# Patient Record
Sex: MALE | Race: WHITE | NOT HISPANIC OR LATINO | URBAN - METROPOLITAN AREA
[De-identification: names, ages, dates, MRNs, and addresses within clinical notes are randomized per-mention and may not be internally consistent; named-entity substitution may affect disease eponyms.]

---

## 2024-10-28 ENCOUNTER — HOSPITAL ENCOUNTER (EMERGENCY)
Facility: HOSPITAL | Age: 44
Discharge: HOME | End: 2024-10-29
Attending: EMERGENCY MEDICINE
Payer: COMMERCIAL

## 2024-10-28 ENCOUNTER — OFFICE VISIT (OUTPATIENT)
Dept: URGENT CARE | Facility: CLINIC | Age: 44
End: 2024-10-28
Payer: COMMERCIAL

## 2024-10-28 VITALS
HEART RATE: 64 BPM | TEMPERATURE: 98.6 F | DIASTOLIC BLOOD PRESSURE: 77 MMHG | OXYGEN SATURATION: 99 % | SYSTOLIC BLOOD PRESSURE: 112 MMHG | RESPIRATION RATE: 18 BRPM

## 2024-10-28 DIAGNOSIS — R19.7 DIARRHEA, UNSPECIFIED TYPE: Primary | ICD-10-CM

## 2024-10-28 DIAGNOSIS — K52.9 COLITIS: Primary | ICD-10-CM

## 2024-10-28 DIAGNOSIS — N20.0 NEPHROLITHIASIS: ICD-10-CM

## 2024-10-28 DIAGNOSIS — R19.7 DIARRHEA: ICD-10-CM

## 2024-10-28 DIAGNOSIS — R79.89 ELEVATED TSH: ICD-10-CM

## 2024-10-28 LAB
ALBUMIN SERPL BCG-MCNC: 4.4 G/DL (ref 3.5–5)
ALP SERPL-CCNC: 45 U/L (ref 34–104)
ALT SERPL W P-5'-P-CCNC: 17 U/L (ref 7–52)
AMORPH URATE CRY URNS QL MICRO: ABNORMAL
ANION GAP SERPL CALCULATED.3IONS-SCNC: 9 MMOL/L (ref 4–13)
AST SERPL W P-5'-P-CCNC: 19 U/L (ref 13–39)
BACTERIA UR QL AUTO: ABNORMAL /HPF
BASOPHILS # BLD AUTO: 0.1 THOUSANDS/ΜL (ref 0–0.1)
BASOPHILS NFR BLD AUTO: 1 % (ref 0–1)
BILIRUB SERPL-MCNC: 1.49 MG/DL (ref 0.2–1)
BILIRUB UR QL STRIP: NEGATIVE
BUN SERPL-MCNC: 12 MG/DL (ref 5–25)
CALCIUM SERPL-MCNC: 8.6 MG/DL (ref 8.4–10.2)
CHLORIDE SERPL-SCNC: 107 MMOL/L (ref 96–108)
CLARITY UR: CLEAR
CO2 SERPL-SCNC: 24 MMOL/L (ref 21–32)
COLOR UR: YELLOW
CREAT SERPL-MCNC: 0.8 MG/DL (ref 0.6–1.3)
EOSINOPHIL # BLD AUTO: 0.32 THOUSAND/ΜL (ref 0–0.61)
EOSINOPHIL NFR BLD AUTO: 3 % (ref 0–6)
ERYTHROCYTE [DISTWIDTH] IN BLOOD BY AUTOMATED COUNT: 11.9 % (ref 11.6–15.1)
FLUAV AG UPPER RESP QL IA.RAPID: NEGATIVE
FLUBV AG UPPER RESP QL IA.RAPID: NEGATIVE
GFR SERPL CREATININE-BSD FRML MDRD: 108 ML/MIN/1.73SQ M
GLUCOSE SERPL-MCNC: 84 MG/DL (ref 65–140)
GLUCOSE UR STRIP-MCNC: NEGATIVE MG/DL
HCT VFR BLD AUTO: 46.8 % (ref 36.5–49.3)
HGB BLD-MCNC: 16 G/DL (ref 12–17)
HGB UR QL STRIP.AUTO: NEGATIVE
IMM GRANULOCYTES # BLD AUTO: 0.03 THOUSAND/UL (ref 0–0.2)
IMM GRANULOCYTES NFR BLD AUTO: 0 % (ref 0–2)
KETONES UR STRIP-MCNC: NEGATIVE MG/DL
LEUKOCYTE ESTERASE UR QL STRIP: NEGATIVE
LIPASE SERPL-CCNC: 16 U/L (ref 11–82)
LYMPHOCYTES # BLD AUTO: 2.8 THOUSANDS/ΜL (ref 0.6–4.47)
LYMPHOCYTES NFR BLD AUTO: 30 % (ref 14–44)
MCH RBC QN AUTO: 32.9 PG (ref 26.8–34.3)
MCHC RBC AUTO-ENTMCNC: 34.2 G/DL (ref 31.4–37.4)
MCV RBC AUTO: 96 FL (ref 82–98)
MONOCYTES # BLD AUTO: 0.77 THOUSAND/ΜL (ref 0.17–1.22)
MONOCYTES NFR BLD AUTO: 8 % (ref 4–12)
MUCOUS THREADS UR QL AUTO: ABNORMAL
NEUTROPHILS # BLD AUTO: 5.36 THOUSANDS/ΜL (ref 1.85–7.62)
NEUTS SEG NFR BLD AUTO: 58 % (ref 43–75)
NITRITE UR QL STRIP: NEGATIVE
NON-SQ EPI CELLS URNS QL MICRO: ABNORMAL /HPF
NRBC BLD AUTO-RTO: 0 /100 WBCS
PH UR STRIP.AUTO: 6 [PH]
PLATELET # BLD AUTO: 235 THOUSANDS/UL (ref 149–390)
PMV BLD AUTO: 10.2 FL (ref 8.9–12.7)
POTASSIUM SERPL-SCNC: 3.9 MMOL/L (ref 3.5–5.3)
PROT SERPL-MCNC: 6.9 G/DL (ref 6.4–8.4)
PROT UR STRIP-MCNC: ABNORMAL MG/DL
RBC # BLD AUTO: 4.86 MILLION/UL (ref 3.88–5.62)
RBC #/AREA URNS AUTO: ABNORMAL /HPF
SARS-COV+SARS-COV-2 AG RESP QL IA.RAPID: NEGATIVE
SODIUM SERPL-SCNC: 140 MMOL/L (ref 135–147)
SP GR UR STRIP.AUTO: >=1.03 (ref 1–1.03)
TSH SERPL DL<=0.05 MIU/L-ACNC: 4.91 UIU/ML (ref 0.45–4.5)
UROBILINOGEN UR STRIP-ACNC: <2 MG/DL
WBC # BLD AUTO: 9.38 THOUSAND/UL (ref 4.31–10.16)
WBC #/AREA URNS AUTO: ABNORMAL /HPF

## 2024-10-28 PROCEDURE — 84439 ASSAY OF FREE THYROXINE: CPT | Performed by: EMERGENCY MEDICINE

## 2024-10-28 PROCEDURE — 81001 URINALYSIS AUTO W/SCOPE: CPT | Performed by: EMERGENCY MEDICINE

## 2024-10-28 PROCEDURE — 89055 LEUKOCYTE ASSESSMENT FECAL: CPT | Performed by: EMERGENCY MEDICINE

## 2024-10-28 PROCEDURE — 83690 ASSAY OF LIPASE: CPT | Performed by: EMERGENCY MEDICINE

## 2024-10-28 PROCEDURE — 87804 INFLUENZA ASSAY W/OPTIC: CPT | Performed by: EMERGENCY MEDICINE

## 2024-10-28 PROCEDURE — 83993 ASSAY FOR CALPROTECTIN FECAL: CPT | Performed by: EMERGENCY MEDICINE

## 2024-10-28 PROCEDURE — 80053 COMPREHEN METABOLIC PANEL: CPT | Performed by: EMERGENCY MEDICINE

## 2024-10-28 PROCEDURE — 87425 ROTAVIRUS AG IA: CPT | Performed by: EMERGENCY MEDICINE

## 2024-10-28 PROCEDURE — 36415 COLL VENOUS BLD VENIPUNCTURE: CPT | Performed by: EMERGENCY MEDICINE

## 2024-10-28 PROCEDURE — 99285 EMERGENCY DEPT VISIT HI MDM: CPT | Performed by: EMERGENCY MEDICINE

## 2024-10-28 PROCEDURE — 84443 ASSAY THYROID STIM HORMONE: CPT | Performed by: EMERGENCY MEDICINE

## 2024-10-28 PROCEDURE — 96361 HYDRATE IV INFUSION ADD-ON: CPT

## 2024-10-28 PROCEDURE — 99284 EMERGENCY DEPT VISIT MOD MDM: CPT

## 2024-10-28 PROCEDURE — 87811 SARS-COV-2 COVID19 W/OPTIC: CPT | Performed by: EMERGENCY MEDICINE

## 2024-10-28 PROCEDURE — 85025 COMPLETE CBC W/AUTO DIFF WBC: CPT | Performed by: EMERGENCY MEDICINE

## 2024-10-28 PROCEDURE — 99213 OFFICE O/P EST LOW 20 MIN: CPT

## 2024-10-28 PROCEDURE — 87505 NFCT AGENT DETECTION GI: CPT | Performed by: EMERGENCY MEDICINE

## 2024-10-28 PROCEDURE — 96374 THER/PROPH/DIAG INJ IV PUSH: CPT

## 2024-10-28 RX ORDER — METOCLOPRAMIDE HYDROCHLORIDE 5 MG/ML
10 INJECTION INTRAMUSCULAR; INTRAVENOUS ONCE
Status: COMPLETED | OUTPATIENT
Start: 2024-10-28 | End: 2024-10-28

## 2024-10-28 RX ADMIN — SODIUM CHLORIDE 1000 ML: 0.9 INJECTION, SOLUTION INTRAVENOUS at 23:42

## 2024-10-28 RX ADMIN — METOCLOPRAMIDE 10 MG: 5 INJECTION, SOLUTION INTRAMUSCULAR; INTRAVENOUS at 23:41

## 2024-10-28 NOTE — PROGRESS NOTES
[facility]        NAME: Jigar Meirda is a 44 y.o. male  : 1980    MRN: [id number]  DATE: 2024  TIME: 9:32 PM    Assessment and Plan   Diarrhea, unspecified type [R19.7]  1. Diarrhea, unspecified type  Transfer to other facility            Patient Instructions     You are to go to the ED for further evaluation of your symptoms.    Follow-up with your PCP after the ED.    If tests are performed, our office will contact you with results only if changes need to made to the care plan discussed with you at the visit. You can review your full results on [facility].      Chief Complaint     Chief Complaint   Patient presents with    Diarrhea     Patient has had diarrhea, nausea, chills, headache, body aches for the past 8 days. Seen in  on Thursday, told it was viral and will go away soon.          History of Present Illness       44-year-old male presenting with stomach cramping and diarrhea that started on 10/20.   Patient reports watery diarrhea daily which he feel is worsening.  Having 3-4 episodes an hour.  No blood in stool.  Feels very fatigued.  Urine is dark.  No known fevers.  Woke up today with severe migraine he attributes to caffeine withdrawal, feels dizzy.  Vomited x 1, non-bloody.  Took 3-4 Imodium in last 4 days per recommendation from last  provider who saw him via telehealth visit, made no difference.  No recent travel.  Denies PMH GI problems / abdominal surgeries.        Review of Systems   Review of Systems   Constitutional:  Positive for fatigue. Negative for chills and fever.   Respiratory:  Negative for chest tightness and shortness of breath.    Cardiovascular:  Negative for chest pain and palpitations.   Gastrointestinal:  Positive for abdominal pain, diarrhea, nausea and vomiting. Negative for abdominal distention and blood in stool.   Genitourinary:  Negative for dysuria and hematuria.   Musculoskeletal:  Negative for back pain and neck  stiffness.   Skin:  Negative for rash.   Neurological:  Positive for dizziness, weakness and headaches.         Current Medications     No current outpatient medications on file.    Current Allergies     Allergies as of 10/28/2024 - Reviewed 10/28/2024   Allergen Reaction Noted    Clarithromycin Hives 11/26/2021            The following portions of the patient's history were reviewed and updated as appropriate: allergies, current medications, past family history, past medical history, past social history, past surgical history and problem list.     No past medical history on file.    No past surgical history on file.    No family history on file.      Medications have been verified.        Objective   /77   Pulse 64   Temp 98.6 °F (37 °C)   Resp 18   SpO2 99%        Physical Exam     Physical Exam  Vitals and nursing note reviewed.   Constitutional:       General: He is not in acute distress.     Appearance: He is not ill-appearing, toxic-appearing or diaphoretic.   HENT:      Head: Normocephalic and atraumatic.      Mouth/Throat:      Mouth: Mucous membranes are dry.   Cardiovascular:      Rate and Rhythm: Normal rate and regular rhythm.      Pulses: Normal pulses.      Heart sounds: Normal heart sounds.   Pulmonary:      Effort: Pulmonary effort is normal.      Breath sounds: Normal breath sounds.   Musculoskeletal:         General: Normal range of motion.      Cervical back: Normal range of motion and neck supple.   Skin:     General: Skin is warm and dry.      Capillary Refill: Capillary refill takes less than 2 seconds.      Coloration: Skin is pale.   Neurological:      Mental Status: He is alert and oriented to person, place, and time.

## 2024-10-28 NOTE — Clinical Note
Jigar Merida was seen and treated in our emergency department on 10/28/2024.                Diagnosis:     Jigar  may return to work on return date.    He may return on this date: 10/30/2024         If you have any questions or concerns, please don't hesitate to call.      Loreto Husain, DO    ______________________________           _______________          _______________  Hospital Representative                              Date                                Time

## 2024-10-29 ENCOUNTER — APPOINTMENT (EMERGENCY)
Dept: CT IMAGING | Facility: HOSPITAL | Age: 44
End: 2024-10-29
Payer: COMMERCIAL

## 2024-10-29 VITALS
TEMPERATURE: 98.4 F | OXYGEN SATURATION: 98 % | RESPIRATION RATE: 18 BRPM | SYSTOLIC BLOOD PRESSURE: 128 MMHG | HEART RATE: 57 BPM | DIASTOLIC BLOOD PRESSURE: 80 MMHG

## 2024-10-29 LAB
C COLI+JEJUNI TUF STL QL NAA+PROBE: NEGATIVE
C DIFF TOX GENS STL QL NAA+PROBE: NEGATIVE
EC STX1+STX2 GENES STL QL NAA+PROBE: NEGATIVE
SALMONELLA SP SPAO STL QL NAA+PROBE: NEGATIVE
SHIGELLA SP+EIEC IPAH STL QL NAA+PROBE: NEGATIVE
T4 FREE SERPL-MCNC: 1.04 NG/DL (ref 0.61–1.12)

## 2024-10-29 PROCEDURE — 96361 HYDRATE IV INFUSION ADD-ON: CPT

## 2024-10-29 PROCEDURE — 74177 CT ABD & PELVIS W/CONTRAST: CPT

## 2024-10-29 RX ADMIN — IOHEXOL 100 ML: 350 INJECTION, SOLUTION INTRAVENOUS at 01:00

## 2024-10-29 NOTE — ED PROVIDER NOTES
ED Disposition       None          Assessment & Plan       Medical Decision Making  Persistence nonbloody diarrhea and abdominal cramping for the past 9 days.  Concern for dehydration, acute colitis, ARGELIA, UTI, inflammatory bowel disease, viral gastroenteritis.  No signs of peritonitis.  No history of GI bleeding.  Appears euvolemic.  Will check labs and imaging.  Will give IV fluids and Reglan for headache and mild nausea.    Labs reviewed.  TSH elevated.  Total bilirubin 1.49, similar to prior.    Signed out to Dr. Husain at end of shift waiting for CT    Amount and/or Complexity of Data Reviewed  Labs: ordered.  Radiology: ordered.    Risk  Prescription drug management.             Medications   iohexol (OMNIPAQUE) 350 MG/ML injection (MULTI-DOSE) 100 mL (has no administration in time range)   metoclopramide (REGLAN) injection 10 mg (10 mg Intravenous Given 10/28/24 2341)   sodium chloride 0.9 % bolus 1,000 mL (1,000 mL Intravenous New Bag 10/28/24 2342)       ED Risk Strat Scores                           SBIRT 22yo+      Flowsheet Row Most Recent Value   Initial Alcohol Screen: US AUDIT-C     1. How often do you have a drink containing alcohol? 0 Filed at: 10/28/2024 2021   2. How many drinks containing alcohol do you have on a typical day you are drinking?  0 Filed at: 10/28/2024 2021   3a. Male UNDER 65: How often do you have five or more drinks on one occasion? 0 Filed at: 10/28/2024 2021   3b. FEMALE Any Age, or MALE 65+: How often do you have 4 or more drinks on one occassion? 0 Filed at: 10/28/2024 2021   Audit-C Score 0 Filed at: 10/28/2024 2021   APARNA: How many times in the past year have you...    Used an illegal drug or used a prescription medication for non-medical reasons? Never Filed at: 10/28/2024 2021                            History of Present Illness       Chief Complaint   Patient presents with    Diarrhea     Pt c/o diarrhea x 8 days. 3-4 episodes an hour. + vomitting this morning. Patient  was seen at  and sent here. Pt also c/o chills, body aches and migraine       History reviewed. No pertinent past medical history.   History reviewed. No pertinent surgical history.   History reviewed. No pertinent family history.   Social History     Tobacco Use    Smoking status: Every Day     Types: Cigarettes    Smokeless tobacco: Never   Substance Use Topics    Alcohol use: Not Currently    Drug use: Never      E-Cigarette/Vaping      E-Cigarette/Vaping Substances      I have reviewed and agree with the history as documented.     44-year-old male with remote orchiectomy, migraines states that he has had a large amount of nonbloody diarrhea since October 20, 2024.  Diarrhea daily.  Diarrhea has become more frequent over the last few days.  Associated with generalized myalgia, headache, nausea.  No fever, chills, dysuria or rash.  Able to drink a lot of water but urinating less than usual.  44-year-old male with family history of Crohn's disease has had denies recent travel, exposure to others with similar symptoms, recent antibiotic use.  Does endorse family history of Crohn's disease.        Review of Systems   Constitutional:  Positive for fatigue. Negative for chills, diaphoresis and fever.   Eyes:  Negative for photophobia.   Respiratory:  Negative for cough and shortness of breath.    Cardiovascular:  Negative for chest pain, palpitations and leg swelling.   Gastrointestinal:  Positive for abdominal pain, diarrhea and nausea. Negative for abdominal distention, blood in stool, constipation and vomiting.   Genitourinary:  Negative for dysuria and flank pain.   Musculoskeletal:  Positive for myalgias. Negative for neck stiffness.   Skin:  Negative for rash and wound.   Neurological:  Positive for weakness and light-headedness. Negative for seizures and syncope.           Objective       ED Triage Vitals [10/28/24 2020]   Temperature Pulse Blood Pressure Respirations SpO2 Patient Position - Orthostatic VS    98.4 °F (36.9 °C) 72 130/78 18 99 % Sitting      Temp Source Heart Rate Source BP Location FiO2 (%) Pain Score    Oral Monitor Right arm -- --      Vitals      Date and Time Temp Pulse SpO2 Resp BP Pain Score FACES Pain Rating User   10/29/24 0045 -- 56 97 % 18 107/58 -- -- SV   10/28/24 2245 -- 59 99 % -- 146/60 -- -- SV   10/28/24 2020 98.4 °F (36.9 °C) 72 99 % 18 130/78 -- -- AM            Physical Exam  Vitals and nursing note reviewed.   Constitutional:       General: He is not in acute distress.     Appearance: He is well-developed and normal weight. He is not ill-appearing or diaphoretic.   HENT:      Head: Normocephalic and atraumatic.      Right Ear: External ear normal.      Left Ear: External ear normal.      Nose: Nose normal.      Mouth/Throat:      Mouth: Mucous membranes are dry.   Eyes:      General: No scleral icterus.     Conjunctiva/sclera: Conjunctivae normal.      Pupils: Pupils are equal, round, and reactive to light.   Neck:      Vascular: No JVD.   Cardiovascular:      Rate and Rhythm: Normal rate and regular rhythm.      Pulses: Normal pulses.      Heart sounds: Normal heart sounds.   Pulmonary:      Effort: Pulmonary effort is normal. No respiratory distress.      Breath sounds: Normal breath sounds.   Abdominal:      General: Abdomen is flat.      Palpations: Abdomen is soft. There is no mass.      Tenderness: There is no abdominal tenderness. There is no right CVA tenderness, left CVA tenderness, guarding or rebound.      Comments: Hyperactive bowel sounds   Musculoskeletal:         General: No tenderness. Normal range of motion.      Cervical back: Normal range of motion and neck supple.      Right lower leg: No edema.      Left lower leg: No edema.   Skin:     General: Skin is warm and dry.      Capillary Refill: Capillary refill takes less than 2 seconds.      Coloration: Skin is not pale.      Findings: No rash.   Neurological:      General: No focal deficit present.      Mental  Status: He is alert and oriented to person, place, and time. Mental status is at baseline.      Sensory: No sensory deficit.      Motor: No weakness.      Deep Tendon Reflexes: Reflexes are normal and symmetric.   Psychiatric:         Mood and Affect: Mood normal.         Behavior: Behavior normal.         Results Reviewed       Procedure Component Value Units Date/Time    Fecal leukocytes [[id number]] Collected: 10/28/24 2345    Lab Status: In process Specimen: Stool from Per Rectum Updated: 10/28/24 2357    Stool Enteric Bacterial Panel by PCR [[id number]] Collected: 10/28/24 2345    Lab Status: In process Specimen: Stool from Per Rectum Updated: 10/28/24 2357    Rotavirus antigen, stool [[id number]] Collected: 10/28/24 2344    Lab Status: In process Specimen: Stool from Rectum Updated: 10/28/24 2356    Calprotectin,Fecal [[id number]] Collected: 10/28/24 2345    Lab Status: In process Specimen: Stool from Rectum Updated: 10/28/24 2356    Clostridium difficile toxin by PCR with EIA [[id number]] Collected: 10/28/24 2344    Lab Status: In process Specimen: Stool from Per Rectum Updated: 10/28/24 2356    TSH, 3rd generation with Free T4 reflex [[id number]]  (Abnormal) Collected: 10/28/24 2026    Lab Status: Final result Specimen: Blood from Arm, Right Updated: 10/28/24 2339     TSH 3RD GENERATON 4.908 uIU/mL     T4, free [[id number]] Collected: 10/28/24 2026    Lab Status: In process Specimen: Blood from Arm, Right Updated: 10/28/24 2339    Urine Microscopic [[id number]]  (Abnormal) Collected: 10/28/24 2036    Lab Status: Final result Specimen: Urine, Clean Catch Updated: 10/28/24 2117     RBC, UA None Seen /hpf      WBC, UA 0-1 /hpf      Epithelial Cells Occasional /hpf      Bacteria, UA None Seen /hpf      MUCUS THREADS Innumerable     Amorphous Crystals, UA Occasional    FLU/COVID Rapid Antigen (30 min. TAT) - Preferred screening test in ED [[id number]]  (Normal) Collected: 10/28/24 2026    Lab Status: Final result  Specimen: Nares from Nose Updated: 10/28/24 2055     SARS COV Rapid Antigen Negative     Influenza A Rapid Antigen Negative     Influenza B Rapid Antigen Negative    Narrative:      This test has been performed using the [employer] Clementina 2 FLU+SARS Antigen test under the Emergency Use Authorization (EUA). This test has been validated by the  and verified by the performing laboratory. The Clementina uses lateral flow immunofluorescent sandwich assay to detect SARS-COV, Influenza A and Influenza B Antigen.     The [employer] Clementina 2 SARS Antigen test does not differentiate between SARS-CoV and SARS-CoV-2.     Negative results are presumptive and may be confirmed with a molecular assay, if necessary, for patient management. Negative results do not rule out SARS-CoV-2 or influenza infection and should not be used as the sole basis for treatment or patient management decisions. A negative test result may occur if the level of antigen in a sample is below the limit of detection of this test.     Positive results are indicative of the presence of viral antigens, but do not rule out bacterial infection or co-infection with other viruses.     All test results should be used as an adjunct to clinical observations and other information available to the provider.    FOR PEDIATRIC PATIENTS - copy/paste COVID Guidelines URL to browser: [email or web address]    Comprehensive metabolic panel [[id number]]  (Abnormal) Collected: 10/28/24 2026    Lab Status: Final result Specimen: Blood from Arm, Right Updated: 10/28/24 2055     Sodium 140 mmol/L      Potassium 3.9 mmol/L      Chloride 107 mmol/L      CO2 24 mmol/L      ANION GAP 9 mmol/L      BUN 12 mg/dL      Creatinine 0.80 mg/dL      Glucose 84 mg/dL      Calcium 8.6 mg/dL      AST 19 U/L      ALT 17 U/L      Alkaline Phosphatase 45 U/L      Total Protein 6.9 g/dL      Albumin 4.4 g/dL      Total Bilirubin 1.49 mg/dL      eGFR 108  ml/min/1.73sq m     Narrative:      National Kidney Disease Foundation guidelines for Chronic Kidney Disease (CKD):     Stage 1 with normal or high GFR (GFR > 90 mL/min/1.73 square meters)    Stage 2 Mild CKD (GFR = 60-89 mL/min/1.73 square meters)    Stage 3A Moderate CKD (GFR = 45-59 mL/min/1.73 square meters)    Stage 3B Moderate CKD (GFR = 30-44 mL/min/1.73 square meters)    Stage 4 Severe CKD (GFR = 15-29 mL/min/1.73 square meters)    Stage 5 End Stage CKD (GFR <15 mL/min/1.73 square meters)  Note: GFR calculation is accurate only with a steady state creatinine    Lipase [[id number]]  (Normal) Collected: 10/28/24 2026    Lab Status: Final result Specimen: Blood from Arm, Right Updated: 10/28/24 2055     Lipase 16 u/L     UA w Reflex to Microscopic w Reflex to Culture [[id number]]  (Abnormal) Collected: 10/28/24 2036    Lab Status: Final result Specimen: Urine, Clean Catch Updated: 10/28/24 2044     Color, UA Yellow     Clarity, UA Clear     Specific Gravity, UA >=1.030     pH, UA 6.0     Leukocytes, UA Negative     Nitrite, UA Negative     Protein, UA Trace mg/dl      Glucose, UA Negative mg/dl      Ketones, UA Negative mg/dl      Urobilinogen, UA <2.0 mg/dl      Bilirubin, UA Negative     Occult Blood, UA Negative    CBC and differential [[id number]] Collected: 10/28/24 2026    Lab Status: Final result Specimen: Blood from Arm, Right Updated: 10/28/24 2037     WBC 9.38 Thousand/uL      RBC 4.86 Million/uL      Hemoglobin 16.0 g/dL      Hematocrit 46.8 %      MCV 96 fL      MCH 32.9 pg      MCHC 34.2 g/dL      RDW 11.9 %      MPV 10.2 fL      Platelets 235 Thousands/uL      nRBC 0 /100 WBCs      Segmented % 58 %      Immature Grans % 0 %      Lymphocytes % 30 %      Monocytes % 8 %      Eosinophils Relative 3 %      Basophils Relative 1 %      Absolute Neutrophils 5.36 Thousands/µL      Absolute Immature Grans 0.03 Thousand/uL      Absolute Lymphocytes 2.80 Thousands/µL      Absolute Monocytes 0.77 Thousand/µL       Eosinophils Absolute 0.32 Thousand/µL      Basophils Absolute 0.10 Thousands/µL             CT abdomen pelvis with contrast    (Results Pending)       Procedures    ED Medication and Procedure Management   None     Patient's Medications    No medications on file     No discharge procedures on file.  ED SEPSIS DOCUMENTATION            Fabricio Olivares DO  10/29/24 0059

## 2024-10-29 NOTE — DISCHARGE INSTRUCTIONS
Please follow-up with gastroenterology for further care, if symptoms worsen please return to emergency department

## 2024-10-30 LAB
CALPROTECTIN STL-MCNC: 8.43 ΜG/G
RV AG STL QL: NEGATIVE
WBC SPEC QL GRAM STN: NORMAL

## 2024-11-17 ENCOUNTER — APPOINTMENT (OUTPATIENT)
Dept: RADIOLOGY | Facility: CLINIC | Age: 44
End: 2024-11-17
Payer: COMMERCIAL

## 2024-11-17 ENCOUNTER — OFFICE VISIT (OUTPATIENT)
Dept: URGENT CARE | Facility: CLINIC | Age: 44
End: 2024-11-17
Payer: COMMERCIAL

## 2024-11-17 VITALS
HEART RATE: 73 BPM | BODY MASS INDEX: 20.76 KG/M2 | OXYGEN SATURATION: 97 % | HEIGHT: 70 IN | TEMPERATURE: 98.3 F | RESPIRATION RATE: 16 BRPM | DIASTOLIC BLOOD PRESSURE: 61 MMHG | SYSTOLIC BLOOD PRESSURE: 103 MMHG | WEIGHT: 145 LBS

## 2024-11-17 DIAGNOSIS — M54.50 ACUTE LOW BACK PAIN, UNSPECIFIED BACK PAIN LATERALITY, UNSPECIFIED WHETHER SCIATICA PRESENT: ICD-10-CM

## 2024-11-17 DIAGNOSIS — M62.830 LUMBAR PARASPINAL MUSCLE SPASM: Primary | ICD-10-CM

## 2024-11-17 PROCEDURE — 99213 OFFICE O/P EST LOW 20 MIN: CPT | Performed by: FAMILY MEDICINE

## 2024-11-17 PROCEDURE — 96372 THER/PROPH/DIAG INJ SC/IM: CPT | Performed by: FAMILY MEDICINE

## 2024-11-17 PROCEDURE — 72100 X-RAY EXAM L-S SPINE 2/3 VWS: CPT

## 2024-11-17 RX ORDER — METHOCARBAMOL 500 MG/1
500 TABLET, FILM COATED ORAL 3 TIMES DAILY
Qty: 30 TABLET | Refills: 0 | Status: SHIPPED | OUTPATIENT
Start: 2024-11-17 | End: 2024-11-27

## 2024-11-17 RX ORDER — PREDNISONE 20 MG/1
40 TABLET ORAL DAILY
Qty: 10 TABLET | Refills: 0 | Status: SHIPPED | OUTPATIENT
Start: 2024-11-17 | End: 2024-11-22

## 2024-11-17 RX ORDER — KETOROLAC TROMETHAMINE 30 MG/ML
30 INJECTION, SOLUTION INTRAMUSCULAR; INTRAVENOUS ONCE
Status: COMPLETED | OUTPATIENT
Start: 2024-11-17 | End: 2024-11-17

## 2024-11-17 RX ADMIN — KETOROLAC TROMETHAMINE 30 MG: 30 INJECTION, SOLUTION INTRAMUSCULAR; INTRAVENOUS at 12:44

## 2024-11-17 NOTE — PROGRESS NOTES
[facility]        NAME: Jigar Merida is a 44 y.o. male  : 1980    MRN: [id number]  DATE: 2024  TIME: 1:31 PM    Assessment and Plan   Lumbar paraspinal muscle spasm [M62.830]  1. Lumbar paraspinal muscle spasm  predniSONE 20 mg tablet    methocarbamol (ROBAXIN) 500 mg tablet      2. Acute low back pain, unspecified back pain laterality, unspecified whether sciatica present  XR spine lumbar 2 or 3 views injury    ketorolac (TORADOL) injection 30 mg    predniSONE 20 mg tablet    methocarbamol (ROBAXIN) 500 mg tablet            Patient Instructions       Follow up with PCP in 3-5 days.  Proceed to  ER if symptoms worsen.    If tests have been performed at [facility], our office will contact you with results if changes need to be made to the care plan discussed with you at the visit.  [facility]    Chief Complaint     Chief Complaint   Patient presents with    Back Pain     Pt states he threw his back out yesterday afternoon while bending over to pick something up. Pt states he took ibprouphen and icy hot to help wit pain.          History of Present Illness       4-year-old male presenting with acute onset of lower back pain.  He reports pain beginning last evening after bending forward to  an applicant.  He report immediate onset of sharp pain described as stabbing sensation that radiates from the left side of his lower back to the right side.  He has been using 800 mg of ibuprofen with no noted relief.  Denies any lower extremity weakness numbness or tingling.  Denies any saddle anesthesia.  Denies any loss of bowel or bladder function.    Back Pain        Review of Systems   Review of Systems   Constitutional: Negative.    HENT: Negative.     Eyes: Negative.    Respiratory: Negative.     Cardiovascular: Negative.    Gastrointestinal: Negative.    Genitourinary: Negative.    Musculoskeletal:  Positive for arthralgias, back pain  "and myalgias.   Skin: Negative.    Allergic/Immunologic: Negative.    Neurological: Negative.    Hematological: Negative.    Psychiatric/Behavioral: Negative.           Current Medications       Current Outpatient Medications:     methocarbamol (ROBAXIN) 500 mg tablet, Take 1 tablet (500 mg total) by mouth 3 (three) times a day for 10 days, Disp: 30 tablet, Rfl: 0    predniSONE 20 mg tablet, Take 2 tablets (40 mg total) by mouth daily for 5 days, Disp: 10 tablet, Rfl: 0  No current facility-administered medications for this visit.    Current Allergies     Allergies as of 11/17/2024 - Reviewed 11/17/2024   Allergen Reaction Noted    Clarithromycin Hives 11/26/2021            The following portions of the patient's history were reviewed and updated as appropriate: allergies, current medications, past family history, past medical history, past social history, past surgical history and problem list.     No past medical history on file.    No past surgical history on file.    No family history on file.      Medications have been verified.        Objective   /61   Pulse 73   Temp 98.3 °F (36.8 °C)   Resp 16   Ht 5' 10\" (1.778 m)   Wt 65.8 kg (145 lb)   SpO2 97%   BMI 20.81 kg/m²   No LMP for male patient.       Physical Exam     Physical Exam  Constitutional:       Appearance: He is well-developed.   HENT:      Head: Normocephalic.      Nose: Nose normal.   Eyes:      Pupils: Pupils are equal, round, and reactive to light.   Cardiovascular:      Rate and Rhythm: Normal rate and regular rhythm.   Pulmonary:      Effort: Pulmonary effort is normal.   Abdominal:      General: Abdomen is flat.   Musculoskeletal:         General: Swelling, tenderness and signs of injury present.      Cervical back: Normal range of motion.      Lumbar back: Spasms and tenderness present. Decreased range of motion.   Skin:     General: Skin is warm.   Neurological:      Mental Status: He is alert and oriented to person, place, and " time.

## 2024-11-26 ENCOUNTER — APPOINTMENT (OUTPATIENT)
Dept: URGENT CARE | Facility: CLINIC | Age: 44
End: 2024-11-26
Payer: OTHER MISCELLANEOUS

## 2024-11-26 PROCEDURE — 99283 EMERGENCY DEPT VISIT LOW MDM: CPT | Performed by: FAMILY MEDICINE

## 2024-11-26 PROCEDURE — G0382 LEV 3 HOSP TYPE B ED VISIT: HCPCS | Performed by: FAMILY MEDICINE

## 2024-12-02 ENCOUNTER — APPOINTMENT (OUTPATIENT)
Dept: RADIOLOGY | Facility: CLINIC | Age: 44
End: 2024-12-02
Payer: OTHER MISCELLANEOUS

## 2024-12-02 ENCOUNTER — OCCMED (OUTPATIENT)
Dept: URGENT CARE | Facility: CLINIC | Age: 44
End: 2024-12-02
Payer: OTHER MISCELLANEOUS

## 2024-12-02 DIAGNOSIS — M79.644 FINGER PAIN, RIGHT: ICD-10-CM

## 2024-12-02 DIAGNOSIS — M79.644 FINGER PAIN, RIGHT: Primary | ICD-10-CM

## 2024-12-02 PROCEDURE — 99213 OFFICE O/P EST LOW 20 MIN: CPT

## 2024-12-02 PROCEDURE — 73140 X-RAY EXAM OF FINGER(S): CPT

## 2024-12-04 ENCOUNTER — APPOINTMENT (OUTPATIENT)
Dept: URGENT CARE | Facility: CLINIC | Age: 44
End: 2024-12-04
Payer: OTHER MISCELLANEOUS

## 2024-12-04 PROCEDURE — 99213 OFFICE O/P EST LOW 20 MIN: CPT | Performed by: FAMILY MEDICINE

## 2024-12-11 ENCOUNTER — OFFICE VISIT (OUTPATIENT)
Dept: OBGYN CLINIC | Facility: CLINIC | Age: 44
End: 2024-12-11
Payer: OTHER MISCELLANEOUS

## 2024-12-11 VITALS
BODY MASS INDEX: 20.33 KG/M2 | WEIGHT: 142 LBS | HEIGHT: 70 IN | DIASTOLIC BLOOD PRESSURE: 58 MMHG | SYSTOLIC BLOOD PRESSURE: 80 MMHG

## 2024-12-11 DIAGNOSIS — S61.210A LACERATION OF RIGHT INDEX FINGER WITHOUT FOREIGN BODY WITHOUT DAMAGE TO NAIL, INITIAL ENCOUNTER: Primary | ICD-10-CM

## 2024-12-11 DIAGNOSIS — M25.641 STIFFNESS OF FINGER JOINT OF RIGHT HAND: ICD-10-CM

## 2024-12-11 PROCEDURE — 99203 OFFICE O/P NEW LOW 30 MIN: CPT | Performed by: ORTHOPAEDIC SURGERY

## 2024-12-11 RX ORDER — DIVALPROEX SODIUM 500 MG/1
500 TABLET, DELAYED RELEASE ORAL 3 TIMES DAILY
COMMUNITY
Start: 2024-12-09 | End: 2025-01-08

## 2024-12-11 RX ORDER — LORAZEPAM 1 MG/1
1 TABLET ORAL DAILY
COMMUNITY
Start: 2024-12-09 | End: 2024-12-16

## 2024-12-11 NOTE — LETTER
December 11, 2024     Patient: Jigar Merida  YOB: 1980  Date of Visit: 12/11/2024      To Whom it May Concern:    Jigar Merida is under my professional care. Jigar was seen in my office on 12/11/2024. Jigar may return to work with limitations - no lifting more than 5 lbs.  May to do light gripping, grasping .    If you have any questions or concerns, please don't hesitate to call.         Sincerely,          Mely Garcia MD        CC: No Recipients

## 2024-12-11 NOTE — PROGRESS NOTES
Assessment/Plan:  1. Laceration of right index finger without foreign body without damage to nail, initial encounter        2. Stiffness of finger joint of right hand            Subjective history, physical examination performed, diagnostic imaging reviewed at today's visit  Diagnosis was discussed-healing laceration of right index finger and right index finger stiffness.  I do not suspect radial digital nerve injury, and 2 point discrimination of the injured finger was comparable to the index finger the contralateral hand.  Treatment options were discussed which included nonoperative and operative treatment.    Risks, benefits, and realistic expectations for treatment options were discussed.    The patient was given an opportunity to ask questions.  Questions were answered to the patient's satisfaction.    Through shared decision making, the patient decided to move forward with  Silipos sleeve and therapy  Work note given to patient- restricted duty of no lifting more than 5, may perform light gripping ang grasping  Follow-up in 4 week for clinical evaluation.          cc: finger laceration    Subjective:   Jigar Merida is a right hand dominant 44 y.o. male who presents for evaluation and treatment of an injury sustained to his right index finger..  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  He stated that he went to urgent care  for initial evaluation and treatment.  Steri-Strips were applied.  He stated that the finger was infected and it was upon follow-up that x-rays were obtained.  The patient reported that he has been taking antibiotics, 3 different antibiotics by report.      Review of Systems No fevers, chills, N/V      History reviewed. No pertinent past medical history.    History reviewed. No pertinent surgical history.    Family History   Problem Relation Age of Onset    Hypertension Mother     Thyroid disease Mother     Heart  disease Mother     Other (low blood pressure) Father     Thyroid disease Father     Thyroid disease Sister        Social History     Occupational History    Not on file   Tobacco Use    Smoking status: Every Day     Types: Cigarettes    Smokeless tobacco: Never   Vaping Use    Vaping status: Never Used   Substance and Sexual Activity    Alcohol use: Not Currently    Drug use: Never    Sexual activity: Not on file         Current Outpatient Medications:     divalproex sodium (DEPAKOTE) 500 mg DR tablet, Take 500 mg by mouth Three times a day, Disp: , Rfl:     LORazepam (ATIVAN) 1 mg tablet, Take 1 mg by mouth daily, Disp: , Rfl:     methocarbamol (ROBAXIN) 500 mg tablet, Take 1 tablet (500 mg total) by mouth 3 (three) times a day for 10 days, Disp: 30 tablet, Rfl: 0    Allergies   Allergen Reactions    Clarithromycin Hives       Objective:  Vitals:    12/11/24 1505   BP: (!) 80/58       The patient was awake, alert, and oriented to person, place, and time.  No acute distress.  Normocephalic.  EOMI.  Mucous membranes moist.  Normal respiratory effort.    Examination of the affected extremity was compared to the unaffected extremity.  Healed laceration of radial border of R IF.  No signs of infection.   No swelling or ecchymosis.  No deformity.  Hand and fingers were warm and well-perfused.  Capillary refill was brisk.  Full active range of motion of the elbows, forearms, wrists, and fingers.  Index finger can be passive flexed into near full composite flexion and patient able to maintain position.  Guards against active flexion.    Two-point discrimation 6 mm on each index finger  No sign of infection      Imaging/Diagnostic Studies:    I reviewed imaging studies dated 12/2/24 which included XR of the right IF .  These images studies demonstrated soft tissue injury. No gas in soft tissues. Normal osseous anatomy.        This document was created using speech voice recognition software.   Grammatical errors, random  word insertions, pronoun errors, and incomplete sentences are an occasional consequence of this system due to software limitations, ambient noise, and hardware issues.   Any formal questions or concerns about content, text, or information contained within the body of this dictation should be directly addressed to the provider for clarification.    Scribe Attestation      I,:   am acting as a scribe while in the presence of the attending physician.:       I,:   personally performed the services described in this documentation    as scribed in my presence.:

## 2024-12-16 ENCOUNTER — EVALUATION (OUTPATIENT)
Dept: OCCUPATIONAL THERAPY | Facility: CLINIC | Age: 44
End: 2024-12-16
Payer: OTHER MISCELLANEOUS

## 2024-12-16 DIAGNOSIS — S61.210A LACERATION OF RIGHT INDEX FINGER WITHOUT FOREIGN BODY WITHOUT DAMAGE TO NAIL, INITIAL ENCOUNTER: ICD-10-CM

## 2024-12-16 PROCEDURE — 97165 OT EVAL LOW COMPLEX 30 MIN: CPT | Performed by: OCCUPATIONAL THERAPIST

## 2024-12-16 PROCEDURE — 97110 THERAPEUTIC EXERCISES: CPT | Performed by: OCCUPATIONAL THERAPIST

## 2024-12-16 PROCEDURE — 97140 MANUAL THERAPY 1/> REGIONS: CPT | Performed by: OCCUPATIONAL THERAPIST

## 2024-12-16 NOTE — PROGRESS NOTES
OT Evaluation     Today's date: 2024  Patient name: Jigar Merida  : 1980  MRN: [id number]  Referring provider: Mely Garcia,*  Dx:   Encounter Diagnosis     ICD-10-CM    1. Laceration of right index finger without foreign body without damage to nail, initial encounter  S61.210A Ambulatory Referral to Occupational Therapy                     Assessment  Impairments: abnormal or restricted ROM, activity intolerance, impaired physical strength, lacks appropriate home exercise program and unable to perform ADL  Functional limitations: limited with use of R for  , pinch , bottles/jars, zippers, buttons, lift /carry  Symptom irritability: moderate    Assessment details: Abelardo presents s/p laceration of R index at the PIP at work. He is on light duty with 5# restriction. He has moderate pain. He has normal  2 point  but has proximal paresthesias . He has decreased composite flexion  but has full PROM. He has mild edema . He has weak  and pinches.  He guards his index . He has functional limitations with gripping , pinching , buttons/zippers. He lives with his wife in an apartment .  Understanding of Dx/Px/POC: good     Prognosis: good    Goals  STG-1 wk  1- I with HEP  2- improve flexion 5 at MCP/PIP/DIP  3- worst pain 5/10    LTG- 6 wks  1- full composite flexion  2- R  50#  3- I ADL - button/zip , bottles/jars , lift 20#  4- RTW full duty      Plan  Patient would benefit from: OT eval, skilled occupational therapy and custom splinting  Planned modality interventions: fluidotherapy and thermotherapy: hydrocollator packs    Planned therapy interventions: ADL retraining, joint mobilization, IASTM, manual therapy, massage, stretching, strengthening, therapeutic activities, therapeutic exercise, fine motor coordination training, home exercise program, flexibility and compression    Frequency: 2x week  Plan of Care beginning date: 2024  Treatment plan discussed with:  patient        Subjective Evaluation    History of Present Illness  Date of onset: 2024  Mechanism of injury: Abelardo suffered laceration R index on a  machine  at work. He went to [facility] and had closure with glue. He reports it reopened and got infected . He has been on antibiotic x2.   Quality of life: good    Patient Goals  Patient goals for therapy: decreased edema, decreased pain, increased motion, increased strength, independence with ADLs/IADLs, return to sport/leisure activities and return to work    Pain  Current pain ratin  At best pain ratin  At worst pain ratin  Location: R index  Quality: tight  Exacerbated by: bumping it , gripping , bottles/jars , push/pull , button/zip.  Progression: improved    Social Support  Steps to enter house: no  Stairs in house: no   Lives in: apartment  Lives with: spouse    Employment status: working (light duty)  Hand dominance: right          Objective     Observations     Additional Observation Details  1.4 cm radial index    Palpation     Additional Palpation Details  Tender incision     Neurological Testing     Additional Neurological Details  C/o paresthesias  laceration  proximal to wrist    2point 5mm R index    Active Range of Motion     Right Wrist   Wrist flexion: 60 degrees   Wrist extension: 65 degrees     Right Digits   Flexion   Index     MCP: 80    PIP: 90    DIP: 60  Extension   Index     MCP: 0    PIP: 0    DIP: 0    Additional Active Range of Motion Details  Blocked PIP 95 ;  DIP 50    Passive Range of Motion     Right Digits   Flexion   Index     MCP: 105    PIP: 100    DIP: 68    Strength/Myotome Testing     Left Wrist/Hand      (2nd hand position)     Trial 1: 55    Thumb Strength  Key/Lateral Pinch     Trial 1: 10  Tip/Two-Point Pinch     Trial 1: 10    Right Wrist/Hand      (2nd hand position)     Trial 1: 25    Thumb Strength   Key/Lateral Pinch     Trial 1: 6  Tip/Two-Point Pinch     Trial 1: 8    Tests     Right  Wrist/Hand   Positive intrinsic muscle tightness.     Swelling     Left Wrist/Hand   Circumference wrist: 6.5 cm    Right Wrist/Hand   Circumference wrist: 7.5 cm            Daily Treatment Diary     Precautions: universal   CO-MORBIDITIES:NA  HEP ACCESS CODE:   FOTO Completed On: 12/16/24    POC Expires Reeval for Medicare to be completed  Auth Expiration Date PT/OT/STVisit Limit   1/31/25 By visit na 12/31/24 na    Completed on visit                  Auth Status DATE 12/16  IE        Approved Visit # 1         Remaining 99        MANUAL THERAPY         Graston  2m        Scar STM  2m        MOB  4m        Retrograde  2m        Intrinsic stretches 2m        Elastomer for night with Coban  2m        THERAPEUTIC EXERCISE HEP 4 x day         A/PROM  index   4m        Powerweb    Red  3x10                                                                                                                                          NEUROMUSCULAR REEDUCATION                                                                                                                                                       THERAPEUTIC ACTIVITY          Pinch pins tip pinch   Red  3x10                                                                                        MODALITIES         MH  in flexion  5m

## 2024-12-20 ENCOUNTER — APPOINTMENT (OUTPATIENT)
Dept: OCCUPATIONAL THERAPY | Facility: CLINIC | Age: 44
End: 2024-12-20
Payer: OTHER MISCELLANEOUS

## 2024-12-23 ENCOUNTER — APPOINTMENT (OUTPATIENT)
Dept: OCCUPATIONAL THERAPY | Facility: CLINIC | Age: 44
End: 2024-12-23
Payer: OTHER MISCELLANEOUS

## 2025-05-19 ENCOUNTER — OFFICE VISIT (OUTPATIENT)
Dept: URGENT CARE | Facility: CLINIC | Age: 45
End: 2025-05-19
Payer: COMMERCIAL

## 2025-05-19 VITALS
RESPIRATION RATE: 16 BRPM | DIASTOLIC BLOOD PRESSURE: 62 MMHG | SYSTOLIC BLOOD PRESSURE: 102 MMHG | WEIGHT: 154 LBS | HEART RATE: 73 BPM | OXYGEN SATURATION: 98 % | TEMPERATURE: 97.7 F | HEIGHT: 70 IN | BODY MASS INDEX: 22.05 KG/M2

## 2025-05-19 DIAGNOSIS — R19.7 VOMITING AND DIARRHEA: Primary | ICD-10-CM

## 2025-05-19 DIAGNOSIS — R11.10 VOMITING AND DIARRHEA: Primary | ICD-10-CM

## 2025-05-19 PROCEDURE — G0381 LEV 2 HOSP TYPE B ED VISIT: HCPCS

## 2025-05-19 PROCEDURE — 99282 EMERGENCY DEPT VISIT SF MDM: CPT

## 2025-05-19 RX ORDER — TRAZODONE HYDROCHLORIDE 100 MG/1
200 TABLET ORAL DAILY
COMMUNITY
Start: 2025-04-28 | End: 2025-05-28

## 2025-05-19 RX ORDER — CLOZAPINE 25 MG/1
25 TABLET ORAL DAILY
COMMUNITY
Start: 2025-04-28

## 2025-05-19 RX ORDER — HALOPERIDOL 5 MG/1
5 TABLET ORAL
COMMUNITY
Start: 2025-04-28 | End: 2025-05-28

## 2025-05-19 RX ORDER — GABAPENTIN 100 MG/1
100 CAPSULE ORAL 3 TIMES DAILY
COMMUNITY
Start: 2025-04-28 | End: 2026-04-28

## 2025-05-19 RX ORDER — BENZTROPINE MESYLATE 0.5 MG/1
0.5 TABLET ORAL 2 TIMES DAILY
COMMUNITY
Start: 2025-02-03

## 2025-05-19 RX ORDER — PALIPERIDONE 6 MG/1
12 TABLET, EXTENDED RELEASE ORAL DAILY
COMMUNITY
Start: 2025-04-28 | End: 2025-05-28

## 2025-05-19 NOTE — PROGRESS NOTES
"Name: Jigar Merida      : 1980      MRN: [id number]  Encounter Provider: Aminta Parrish PA-C  Encounter Date: 2025   Encounter department: [facility]  :  Assessment & Plan  Vomiting and diarrhea         Likely viral in nature. Recommended increased fluids, bland diet, and tylenol as needed for fevers. Pt knows return precautions and work note provided.    History of Present Illness   HPI  Jigar Merida is a 44 y.o. male who presents with diarrhea, 1 episode of vomiting, and stomach cramps x 2 days. Pt also reports low grade fever this morning, 100.5. Pt states tylenol at 6am today.     Endorses 2 episodes of GI bug within the last year.        Review of Systems   Constitutional:  Negative for fever.   Gastrointestinal:  Positive for diarrhea, nausea and vomiting.          Objective   /62   Pulse 73   Temp 97.7 °F (36.5 °C)   Resp 16   Ht 5' 10\" (1.778 m)   Wt 69.9 kg (154 lb)   SpO2 98%   BMI 22.10 kg/m²      Physical Exam  Constitutional:       Appearance: He is ill-appearing.   HENT:      Head: Normocephalic and atraumatic.     Cardiovascular:      Rate and Rhythm: Normal rate.   Pulmonary:      Effort: Pulmonary effort is normal.   Abdominal:      General: Abdomen is flat.      Palpations: Abdomen is soft. There is no mass.      Tenderness: There is abdominal tenderness.      Comments: Tenderness to epigastric region     Neurological:      Mental Status: He is alert.           "

## 2025-05-19 NOTE — LETTER
May 19, 2025     Patient: Jigar Merida   YOB: 1980   Date of Visit: 5/19/2025       To Whom it May Concern:    Jigar Merida was seen in my clinic on 5/19/2025. He may return to work on 5/21/25.    If you have any questions or concerns, please don't hesitate to call.         Sincerely,          Aminta Parrish PA-C        CC: No Recipients

## 2025-05-21 ENCOUNTER — OFFICE VISIT (OUTPATIENT)
Dept: URGENT CARE | Facility: CLINIC | Age: 45
End: 2025-05-21
Payer: COMMERCIAL

## 2025-05-21 ENCOUNTER — HOSPITAL ENCOUNTER (EMERGENCY)
Facility: HOSPITAL | Age: 45
Discharge: HOME | End: 2025-05-21
Attending: EMERGENCY MEDICINE
Payer: COMMERCIAL

## 2025-05-21 ENCOUNTER — APPOINTMENT (EMERGENCY)
Dept: CT IMAGING | Facility: HOSPITAL | Age: 45
End: 2025-05-21
Payer: COMMERCIAL

## 2025-05-21 VITALS
RESPIRATION RATE: 18 BRPM | DIASTOLIC BLOOD PRESSURE: 65 MMHG | SYSTOLIC BLOOD PRESSURE: 105 MMHG | OXYGEN SATURATION: 98 % | BODY MASS INDEX: 22.05 KG/M2 | TEMPERATURE: 98.5 F | HEART RATE: 76 BPM | WEIGHT: 154 LBS | HEIGHT: 70 IN

## 2025-05-21 VITALS
OXYGEN SATURATION: 96 % | SYSTOLIC BLOOD PRESSURE: 112 MMHG | HEART RATE: 70 BPM | RESPIRATION RATE: 18 BRPM | DIASTOLIC BLOOD PRESSURE: 66 MMHG | TEMPERATURE: 98.2 F

## 2025-05-21 DIAGNOSIS — R19.7 NAUSEA VOMITING AND DIARRHEA: Primary | ICD-10-CM

## 2025-05-21 DIAGNOSIS — R11.2 NAUSEA VOMITING AND DIARRHEA: Primary | ICD-10-CM

## 2025-05-21 DIAGNOSIS — R10.32 LEFT LOWER QUADRANT ABDOMINAL PAIN: Primary | ICD-10-CM

## 2025-05-21 LAB
ALBUMIN SERPL BCG-MCNC: 4.5 G/DL (ref 3.5–5)
ALP SERPL-CCNC: 43 U/L (ref 34–104)
ALT SERPL W P-5'-P-CCNC: 21 U/L (ref 7–52)
ANION GAP SERPL CALCULATED.3IONS-SCNC: 8 MMOL/L (ref 4–13)
AST SERPL W P-5'-P-CCNC: 19 U/L (ref 13–39)
BASOPHILS # BLD AUTO: 0.08 THOUSANDS/ÂΜL (ref 0–0.1)
BASOPHILS NFR BLD AUTO: 1 % (ref 0–1)
BILIRUB SERPL-MCNC: 0.77 MG/DL (ref 0.2–1)
BUN SERPL-MCNC: 9 MG/DL (ref 5–25)
CALCIUM SERPL-MCNC: 9.4 MG/DL (ref 8.4–10.2)
CHLORIDE SERPL-SCNC: 103 MMOL/L (ref 96–108)
CO2 SERPL-SCNC: 26 MMOL/L (ref 21–32)
CREAT SERPL-MCNC: 0.83 MG/DL (ref 0.6–1.3)
EOSINOPHIL # BLD AUTO: 0.34 THOUSAND/ÂΜL (ref 0–0.61)
EOSINOPHIL NFR BLD AUTO: 5 % (ref 0–6)
ERYTHROCYTE [DISTWIDTH] IN BLOOD BY AUTOMATED COUNT: 12.2 % (ref 11.6–15.1)
GFR SERPL CREATININE-BSD FRML MDRD: 107 ML/MIN/1.73SQ M
GLUCOSE SERPL-MCNC: 82 MG/DL (ref 65–140)
HCT VFR BLD AUTO: 43.6 % (ref 36.5–49.3)
HGB BLD-MCNC: 14.9 G/DL (ref 12–17)
IMM GRANULOCYTES # BLD AUTO: 0.01 THOUSAND/UL (ref 0–0.2)
IMM GRANULOCYTES NFR BLD AUTO: 0 % (ref 0–2)
LIPASE SERPL-CCNC: 25 U/L (ref 11–82)
LYMPHOCYTES # BLD AUTO: 3.3 THOUSANDS/ÂΜL (ref 0.6–4.47)
LYMPHOCYTES NFR BLD AUTO: 43 % (ref 14–44)
MCH RBC QN AUTO: 32.8 PG (ref 26.8–34.3)
MCHC RBC AUTO-ENTMCNC: 34.2 G/DL (ref 31.4–37.4)
MCV RBC AUTO: 96 FL (ref 82–98)
MONOCYTES # BLD AUTO: 0.76 THOUSAND/ÂΜL (ref 0.17–1.22)
MONOCYTES NFR BLD AUTO: 10 % (ref 4–12)
NEUTROPHILS # BLD AUTO: 3.1 THOUSANDS/ÂΜL (ref 1.85–7.62)
NEUTS SEG NFR BLD AUTO: 41 % (ref 43–75)
NRBC BLD AUTO-RTO: 0 /100 WBCS
PLATELET # BLD AUTO: 193 THOUSANDS/UL (ref 149–390)
PMV BLD AUTO: 10.1 FL (ref 8.9–12.7)
POTASSIUM SERPL-SCNC: 4 MMOL/L (ref 3.5–5.3)
PROT SERPL-MCNC: 6.9 G/DL (ref 6.4–8.4)
RBC # BLD AUTO: 4.54 MILLION/UL (ref 3.88–5.62)
SODIUM SERPL-SCNC: 137 MMOL/L (ref 135–147)
WBC # BLD AUTO: 7.59 THOUSAND/UL (ref 4.31–10.16)

## 2025-05-21 PROCEDURE — 96361 HYDRATE IV INFUSION ADD-ON: CPT

## 2025-05-21 PROCEDURE — 74177 CT ABD & PELVIS W/CONTRAST: CPT

## 2025-05-21 PROCEDURE — 85025 COMPLETE CBC W/AUTO DIFF WBC: CPT

## 2025-05-21 PROCEDURE — 99284 EMERGENCY DEPT VISIT MOD MDM: CPT

## 2025-05-21 PROCEDURE — 96375 TX/PRO/DX INJ NEW DRUG ADDON: CPT

## 2025-05-21 PROCEDURE — 36415 COLL VENOUS BLD VENIPUNCTURE: CPT

## 2025-05-21 PROCEDURE — 99283 EMERGENCY DEPT VISIT LOW MDM: CPT | Performed by: FAMILY MEDICINE

## 2025-05-21 PROCEDURE — G0382 LEV 3 HOSP TYPE B ED VISIT: HCPCS | Performed by: FAMILY MEDICINE

## 2025-05-21 PROCEDURE — 83690 ASSAY OF LIPASE: CPT

## 2025-05-21 PROCEDURE — 80053 COMPREHEN METABOLIC PANEL: CPT

## 2025-05-21 PROCEDURE — 96374 THER/PROPH/DIAG INJ IV PUSH: CPT

## 2025-05-21 RX ORDER — KETOROLAC TROMETHAMINE 30 MG/ML
15 INJECTION, SOLUTION INTRAMUSCULAR; INTRAVENOUS ONCE
Status: COMPLETED | OUTPATIENT
Start: 2025-05-21 | End: 2025-05-21

## 2025-05-21 RX ORDER — LOPERAMIDE HYDROCHLORIDE 2 MG/1
2 CAPSULE ORAL 4 TIMES DAILY PRN
Qty: 12 CAPSULE | Refills: 0 | Status: SHIPPED | OUTPATIENT
Start: 2025-05-21

## 2025-05-21 RX ORDER — ONDANSETRON 4 MG/1
4 TABLET, FILM COATED ORAL EVERY 6 HOURS
Qty: 12 TABLET | Refills: 0 | Status: SHIPPED | OUTPATIENT
Start: 2025-05-21

## 2025-05-21 RX ORDER — ONDANSETRON 2 MG/ML
4 INJECTION INTRAMUSCULAR; INTRAVENOUS ONCE
Status: COMPLETED | OUTPATIENT
Start: 2025-05-21 | End: 2025-05-21

## 2025-05-21 RX ORDER — CELECOXIB 100 MG/1
100 CAPSULE ORAL 2 TIMES DAILY
Qty: 20 CAPSULE | Refills: 0 | Status: SHIPPED | OUTPATIENT
Start: 2025-05-21

## 2025-05-21 RX ADMIN — SODIUM CHLORIDE 1000 ML: 0.9 INJECTION, SOLUTION INTRAVENOUS at 16:04

## 2025-05-21 RX ADMIN — IOHEXOL 100 ML: 350 INJECTION, SOLUTION INTRAVENOUS at 16:28

## 2025-05-21 RX ADMIN — KETOROLAC TROMETHAMINE 15 MG: 30 INJECTION, SOLUTION INTRAMUSCULAR; INTRAVENOUS at 16:15

## 2025-05-21 RX ADMIN — ONDANSETRON 4 MG: 2 INJECTION, SOLUTION INTRAMUSCULAR; INTRAVENOUS at 16:15

## 2025-05-21 NOTE — PROGRESS NOTES
[facility]        NAME: Jigar Merida is a 44 y.o. male  : 1980    MRN: [id number]  DATE: May 21, 2025  TIME: 2:55 PM    Assessment and Plan   Left lower quadrant abdominal pain [R10.32]  1. Left lower quadrant abdominal pain  Transfer to other facility            Patient Instructions       Follow up with PCP in 3-5 days.  Proceed to  ER if symptoms worsen.    If tests have been performed at [facility], our office will contact you with results if changes need to be made to the care plan discussed with you at the visit.  [facility]    Chief Complaint     Chief Complaint   Patient presents with    Nausea     Patient states he's had diarrhea, nausea, a fever that broke yesterday and body aches and chills since . Patient has taken Tylenol for the fever and Pepto for the diarrhea. Patient states he threw up the first few days.          History of Present Illness       44-year-old male presenting with 5-day history of abdominal pain, nausea, vomiting diarrhea.  He states that his vomiting has resolved however he does continue to feel nauseous and does continue to experience multiple episodes of diarrhea daily.  Pain is located along the left lower region of his abdomen described as a sharp painful sensation.  He does report having frequent chills and night sweats but has not been taking his temperature.    Nausea  Associated symptoms include nausea and vomiting.       Review of Systems   Review of Systems   Constitutional: Negative.    HENT: Negative.     Eyes: Negative.    Respiratory: Negative.     Cardiovascular: Negative.    Gastrointestinal:  Positive for diarrhea, nausea and vomiting.   Genitourinary: Negative.    Skin: Negative.    Allergic/Immunologic: Negative.    Neurological: Negative.    Hematological: Negative.    Psychiatric/Behavioral: Negative.           Current Medications     Current Medications[1]    Current Allergies      Allergies as of 05/21/2025 - Reviewed 05/19/2025   Allergen Reaction Noted    Clarithromycin Hives 11/26/2021    Lamotrigine Rash 12/09/2024            The following portions of the patient's history were reviewed and updated as appropriate: allergies, current medications, past family history, past medical history, past social history, past surgical history and problem list.     No past medical history on file.    No past surgical history on file.    Family History   Problem Relation Name Age of Onset    Hypertension Mother      Thyroid disease Mother      Heart disease Mother      Other (low blood pressure) Father      Thyroid disease Father      Thyroid disease Sister           Medications have been verified.        Objective   /66   Pulse 70   Temp 98.2 °F (36.8 °C)   Resp 18   SpO2 96%   No LMP for male patient.       Physical Exam     Physical Exam  Constitutional:       Appearance: He is well-developed.   HENT:      Head: Normocephalic.      Nose: Nose normal.     Eyes:      Pupils: Pupils are equal, round, and reactive to light.       Cardiovascular:      Rate and Rhythm: Normal rate and regular rhythm.   Pulmonary:      Effort: Pulmonary effort is normal.   Abdominal:      Tenderness: There is abdominal tenderness in the left lower quadrant. There is guarding and rebound.     Musculoskeletal:         General: Normal range of motion.      Cervical back: Normal range of motion.     Skin:     General: Skin is warm.     Neurological:      Mental Status: He is alert.                        [1]   Current Outpatient Medications:     benztropine (COGENTIN) 0.5 mg tablet, Take 0.5 mg by mouth 2 (two) times a day, Disp: , Rfl:     cloZAPine (CLOZARIL) 25 mg tablet, Take 25 mg by mouth daily, Disp: , Rfl:     divalproex sodium (DEPAKOTE) 500 mg DR tablet, Take 500 mg by mouth in the morning and 500 mg at noon and 500 mg in the evening., Disp: , Rfl:     gabapentin (NEURONTIN) 100 mg capsule, Take 100 mg  by mouth in the morning and 100 mg at noon and 100 mg in the evening., Disp: , Rfl:     haloperidol (HALDOL) 5 mg tablet, Take 5 mg by mouth, Disp: , Rfl:     LORazepam (ATIVAN) 1 mg tablet, Take 1 mg by mouth daily (Patient not taking: Reported on 5/19/2025), Disp: , Rfl:     methocarbamol (ROBAXIN) 500 mg tablet, Take 1 tablet (500 mg total) by mouth 3 (three) times a day for 10 days (Patient not taking: Reported on 5/19/2025), Disp: 30 tablet, Rfl: 0    paliperidone (INVEGA) 6 MG 24 hr tablet, Take 12 mg by mouth daily, Disp: , Rfl:     traZODone (DESYREL) 100 mg tablet, Take 200 mg by mouth daily, Disp: , Rfl:

## 2025-05-21 NOTE — Clinical Note
Jigar Merida was seen and treated in our emergency department on 5/21/2025.    No restrictions            Diagnosis:     Jigar  .    He may return on this date:          If you have any questions or concerns, please don't hesitate to call.      Lew Briscoe, DO    ______________________________           _______________          _______________  Hospital Representative                              Date                                Time

## 2025-05-21 NOTE — ED PROVIDER NOTES
Time reflects when diagnosis was documented in both MDM as applicable and the Disposition within this note       Time User Action Codes Description Comment    5/21/2025  5:04 PM Balta Ortiz Add [O80] NVD (normal vaginal delivery)     5/21/2025  5:04 PM Balta Ortiz Remove [O80] NVD (normal vaginal delivery)     5/21/2025  5:04 PM Balta Ortiz Add [R11.2,  R19.7] Nausea vomiting and diarrhea           ED Disposition       ED Disposition   Discharge    Condition   Stable    Date/Time   Wed May 21, 2025  5:04 PM    Comment   Jigar Merida discharge to home/self care.                   Assessment & Plan       Medical Decision Making  44-year-old male presents today with concerns of abdominal pain, since Sunday.  Associated with diarrhea, vomiting, both of which are nonbloody.  On exam, has got tenderness to palpation.  Labs overall reassuring.  Patient's pain and nausea was relieved by medications.  CT consistent with diarrheal disease, but no diverticulitis or other acute abdominopelvic pathology identified.  Will discharge and have patient follow-up with his family doctor.  Were not provided.  Pain medication and antidiarrheal medication sent over patient pharmacy.  ------------------------------------------------------------  Strict return precautions discussed. Patient at time of discharge well-appearing in no acute distress, all questions answered. Patient agreeable to plan.  Patient's vitals, lab/imaging results, diagnosis, and treatment plan were discussed with the patient. All new/changed medications were discussed with patient, specifically, route of administration, how often and when to take, and where they can be picked up. Strict return precautions as well as close follow up with PCP was discussed with the patient and the patient was agreeable to my recommendations.  Patient verbally acknowledged understanding of the above communications. All labs reviewed and utilized in the medical decision  "making process (if labs were ordered). Portions of the record may have been created with voice recognition software.  Occasional wrong word or \"sound a like\" substitutions may have occurred due to the inherent limitations of voice recognition software.  Read the chart carefully and recognize, using context, where substitutions have occurred.      Amount and/or Complexity of Data Reviewed  Radiology: ordered.    Risk  Prescription drug management.             Medications   ketorolac (TORADOL) injection 15 mg (15 mg Intravenous Given 5/21/25 1615)   ondansetron (ZOFRAN) injection 4 mg (4 mg Intravenous Given 5/21/25 1615)   sodium chloride 0.9 % bolus 1,000 mL (0 mL Intravenous Stopped 5/21/25 1710)   iohexol (OMNIPAQUE) 350 MG/ML injection (MULTI-DOSE) 100 mL (100 mL Intravenous Given 5/21/25 1628)       ED Risk Strat Scores                    No data recorded        SBIRT 20yo+      Flowsheet Row Most Recent Value   Initial Alcohol Screen: US AUDIT-C     1. How often do you have a drink containing alcohol? 0 Filed at: 05/21/2025 1537   2. How many drinks containing alcohol do you have on a typical day you are drinking?  0 Filed at: 05/21/2025 1537   3a. Male UNDER 65: How often do you have five or more drinks on one occasion? 0 Filed at: 05/21/2025 1537   3b. FEMALE Any Age, or MALE 65+: How often do you have 4 or more drinks on one occassion? 0 Filed at: 05/21/2025 1537   Audit-C Score 0 Filed at: 05/21/2025 1537   APARNA: How many times in the past year have you...    Used an illegal drug or used a prescription medication for non-medical reasons? Never Filed at: 05/21/2025 1537                            History of Present Illness       Chief Complaint   Patient presents with    Abdominal Pain     Pt with hx of diverticulosis. Pt went to urgent care for LLQ pain 8/10 pain       Past Medical History[1]   Past Surgical History[2]   Family History[3]   Social History[4]   E-Cigarette/Vaping    E-Cigarette Use Never " User       E-Cigarette/Vaping Substances      I have reviewed and agree with the history as documented.     44-year-old male presenting today with concerns of left lower quadrant abdominal pain for the last 3 to 4 days.  Started on Sunday.  Some nausea and vomiting.  Some diarrhea.  Both are nonbloody.  Had a history of this in the past, was diagnosed with diverticulitis however this time it feels different.  Last time he did not kidney antibiotics and 1 went on stone.  No chest pain or shortness of breath.  No fevers.  Some chills.        Review of Systems   Constitutional:  Negative for chills and fever.   HENT:  Negative for ear pain and sore throat.    Eyes:  Negative for pain and visual disturbance.   Respiratory:  Negative for cough and shortness of breath.    Cardiovascular:  Negative for chest pain and palpitations.   Gastrointestinal:  Positive for abdominal pain, diarrhea, nausea and vomiting. Negative for abdominal distention, anal bleeding, blood in stool, constipation and rectal pain.   Genitourinary:  Negative for dysuria and hematuria.   Musculoskeletal:  Negative for arthralgias and back pain.   Skin:  Negative for color change and rash.   Neurological:  Negative for seizures and syncope.   All other systems reviewed and are negative.          Objective       ED Triage Vitals [05/21/25 1538]   Temperature Pulse Blood Pressure Respirations SpO2 Patient Position - Orthostatic VS   98.5 °F (36.9 °C) 76 105/65 18 98 % Sitting      Temp Source Heart Rate Source BP Location FiO2 (%) Pain Score    Temporal Monitor Left arm -- 8      Vitals      Date and Time Temp Pulse SpO2 Resp BP Pain Score FACES Pain Rating User   05/21/25 1615 -- -- -- -- -- 8 -- TH 05/21/25 1555 -- -- -- -- -- 8 -- TH 05/21/25 1538 98.5 °F (36.9 °C) 76 98 % 18 105/65 8 -- CM            Physical Exam  Vitals and nursing note reviewed.   Constitutional:       General: He is not in acute distress.     Appearance: He is not  ill-appearing.   HENT:      Head: Normocephalic and atraumatic.     Eyes:      General: No scleral icterus.     Conjunctiva/sclera: Conjunctivae normal.      Pupils: Pupils are equal, round, and reactive to light.       Cardiovascular:      Rate and Rhythm: Normal rate and regular rhythm.      Pulses: Normal pulses.   Pulmonary:      Effort: Pulmonary effort is normal. No respiratory distress.   Abdominal:      Tenderness: There is generalized abdominal tenderness and tenderness in the right lower quadrant, suprapubic area and left lower quadrant. There is no right CVA tenderness, left CVA tenderness or rebound. Negative signs include Parrish's sign and Rovsing's sign.     Musculoskeletal:         General: Normal range of motion.      Cervical back: Normal range of motion.     Skin:     General: Skin is warm and dry.      Capillary Refill: Capillary refill takes less than 2 seconds.      Coloration: Skin is not jaundiced.     Neurological:      Mental Status: He is alert and oriented to person, place, and time.         Results Reviewed       Procedure Component Value Units Date/Time    Comprehensive metabolic panel [[id number]] Collected: 05/21/25 1542    Lab Status: Final result Specimen: Blood from Arm, Right Updated: 05/21/25 1606     Sodium 137 mmol/L      Potassium 4.0 mmol/L      Chloride 103 mmol/L      CO2 26 mmol/L      ANION GAP 8 mmol/L      BUN 9 mg/dL      Creatinine 0.83 mg/dL      Glucose 82 mg/dL      Calcium 9.4 mg/dL      AST 19 U/L      ALT 21 U/L      Alkaline Phosphatase 43 U/L      Total Protein 6.9 g/dL      Albumin 4.5 g/dL      Total Bilirubin 0.77 mg/dL      eGFR 107 ml/min/1.73sq m     Narrative:      [employer] guidelines for Chronic Kidney Disease (CKD):     Stage 1 with normal or high GFR (GFR > 90 mL/min/1.73 square meters)    Stage 2 Mild CKD (GFR = 60-89 mL/min/1.73 square meters)    Stage 3A Moderate CKD (GFR = 45-59 mL/min/1.73 square meters)    Stage 3B  Moderate CKD (GFR = 30-44 mL/min/1.73 square meters)    Stage 4 Severe CKD (GFR = 15-29 mL/min/1.73 square meters)    Stage 5 End Stage CKD (GFR <15 mL/min/1.73 square meters)  Note: GFR calculation is accurate only with a steady state creatinine    Lipase [[id number]]  (Normal) Collected: 05/21/25 1542    Lab Status: Final result Specimen: Blood from Arm, Right Updated: 05/21/25 1606     Lipase 25 u/L     CBC and differential [[id number]]  (Abnormal) Collected: 05/21/25 1542    Lab Status: Final result Specimen: Blood from Arm, Right Updated: 05/21/25 1551     WBC 7.59 Thousand/uL      RBC 4.54 Million/uL      Hemoglobin 14.9 g/dL      Hematocrit 43.6 %      MCV 96 fL      MCH 32.8 pg      MCHC 34.2 g/dL      RDW 12.2 %      MPV 10.1 fL      Platelets 193 Thousands/uL      nRBC 0 /100 WBCs      Segmented % 41 %      Immature Grans % 0 %      Lymphocytes % 43 %      Monocytes % 10 %      Eosinophils Relative 5 %      Basophils Relative 1 %      Absolute Neutrophils 3.10 Thousands/µL      Absolute Immature Grans 0.01 Thousand/uL      Absolute Lymphocytes 3.30 Thousands/µL      Absolute Monocytes 0.76 Thousand/µL      Eosinophils Absolute 0.34 Thousand/µL      Basophils Absolute 0.08 Thousands/µL             CT abdomen pelvis with contrast   Final Interpretation by Tommy Wakefield MD (05/21 1645)      Increased fluid throughout the small and large bowel consistent with non-specific diarrheal state. Considerations include infectious, inflammatory, malabsorptive, metabolic/endocrine etiologies.      Hepatomegaly.         Workstation performed: [id number]             Procedures    ED Medication and Procedure Management   Prior to Admission Medications   Prescriptions Last Dose Informant Patient Reported? Taking?   LORazepam (ATIVAN) 1 mg tablet  Self Yes No   Sig: Take 1 mg by mouth daily   Patient not taking: Reported on 5/19/2025   benztropine (COGENTIN) 0.5 mg tablet   Yes No   Sig: Take 0.5 mg by mouth 2 (two) times  a day   cloZAPine (CLOZARIL) 25 mg tablet   Yes No   Sig: Take 25 mg by mouth daily   divalproex sodium (DEPAKOTE) 500 mg DR tablet  Self Yes No   Sig: Take 500 mg by mouth in the morning and 500 mg at noon and 500 mg in the evening.   gabapentin (NEURONTIN) 100 mg capsule   Yes No   Sig: Take 100 mg by mouth in the morning and 100 mg at noon and 100 mg in the evening.   haloperidol (HALDOL) 5 mg tablet   Yes No   Sig: Take 5 mg by mouth   methocarbamol (ROBAXIN) 500 mg tablet   No No   Sig: Take 1 tablet (500 mg total) by mouth 3 (three) times a day for 10 days   Patient not taking: Reported on 5/19/2025   paliperidone (INVEGA) 6 MG 24 hr tablet   Yes No   Sig: Take 12 mg by mouth daily   traZODone (DESYREL) 100 mg tablet   Yes No   Sig: Take 200 mg by mouth daily      Facility-Administered Medications: None     Discharge Medication List as of 5/21/2025  5:06 PM        START taking these medications    Details   celecoxib (CeleBREX) 100 mg capsule Take 1 capsule (100 mg total) by mouth 2 (two) times a day, Starting Wed 5/21/2025, Normal      loperamide (IMODIUM) 2 mg capsule Take 1 capsule (2 mg total) by mouth 4 (four) times a day as needed for diarrhea, Starting Wed 5/21/2025, Normal      ondansetron (ZOFRAN) 4 mg tablet Take 1 tablet (4 mg total) by mouth every 6 (six) hours, Starting Wed 5/21/2025, Normal           CONTINUE these medications which have NOT CHANGED    Details   benztropine (COGENTIN) 0.5 mg tablet Take 0.5 mg by mouth 2 (two) times a day, Starting Mon 2/3/2025, Historical Med      cloZAPine (CLOZARIL) 25 mg tablet Take 25 mg by mouth daily, Starting Mon 4/28/2025, Historical Med      divalproex sodium (DEPAKOTE) 500 mg DR tablet Take 500 mg by mouth in the morning and 500 mg at noon and 500 mg in the evening., Starting Mon 12/9/2024, Until Mon 5/19/2025, Historical Med      gabapentin (NEURONTIN) 100 mg capsule Take 100 mg by mouth in the morning and 100 mg at noon and 100 mg in the evening.,  Starting Mon 4/28/2025, Until Tue 4/28/2026, Historical Med      haloperidol (HALDOL) 5 mg tablet Take 5 mg by mouth, Starting Mon 4/28/2025, Until Wed 5/28/2025 at 2359, Historical Med      LORazepam (ATIVAN) 1 mg tablet Take 1 mg by mouth daily, Starting Mon 12/9/2024, Until Mon 12/16/2024, Historical Med      methocarbamol (ROBAXIN) 500 mg tablet Take 1 tablet (500 mg total) by mouth 3 (three) times a day for 10 days, Starting Sun 11/17/2024, Until Wed 11/27/2024, Normal      paliperidone (INVEGA) 6 MG 24 hr tablet Take 12 mg by mouth daily, Starting Mon 4/28/2025, Until Wed 5/28/2025, Historical Med      traZODone (DESYREL) 100 mg tablet Take 200 mg by mouth daily, Starting Mon 4/28/2025, Until Wed 5/28/2025, Historical Med           No discharge procedures on file.  ED SEPSIS DOCUMENTATION   Time reflects when diagnosis was documented in both MDM as applicable and the Disposition within this note       Time User Action Codes Description Comment    5/21/2025  5:04 PM Balta Ortiz Add [O80] NVD (normal vaginal delivery)     5/21/2025  5:04 PM Balta Ortiz Remove [O80] NVD (normal vaginal delivery)     5/21/2025  5:04 PM Balta Ortiz Add [R11.2,  R19.7] Nausea vomiting and diarrhea                      [1]   Past Medical History:  Diagnosis Date    Bipolar 2 disorder (HCC)     Diverticulitis     Psychiatric disorder    [2] No past surgical history on file.  [3]   Family History  Problem Relation Name Age of Onset    Hypertension Mother      Thyroid disease Mother      Heart disease Mother      Other (low blood pressure) Father      Thyroid disease Father      Thyroid disease Sister     [4]   Social History  Tobacco Use    Smoking status: Every Day     Types: Cigarettes    Smokeless tobacco: Never   Vaping Use    Vaping status: Never Used   Substance Use Topics    Alcohol use: Not Currently    Drug use: Never        Balta Ortiz PA-C  05/21/25 4605